# Patient Record
Sex: FEMALE | NOT HISPANIC OR LATINO | URBAN - METROPOLITAN AREA
[De-identification: names, ages, dates, MRNs, and addresses within clinical notes are randomized per-mention and may not be internally consistent; named-entity substitution may affect disease eponyms.]

---

## 2019-11-20 ENCOUNTER — IMPORTED ENCOUNTER (OUTPATIENT)
Dept: URBAN - METROPOLITAN AREA CLINIC 31 | Facility: CLINIC | Age: 59
End: 2019-11-20

## 2019-11-20 PROBLEM — H53.461: Noted: 2019-11-20

## 2019-11-20 PROCEDURE — 92004 COMPRE OPH EXAM NEW PT 1/>: CPT

## 2019-11-20 PROCEDURE — 92083 EXTENDED VISUAL FIELD XM: CPT

## 2019-11-20 PROCEDURE — 92015 DETERMINE REFRACTIVE STATE: CPT

## 2019-11-20 NOTE — PATIENT DISCUSSION
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   Dx Pituitary Tumor--Superior nasal defcet OD and small superior nasal defect OS--

## 2019-11-20 NOTE — PATIENT DISCUSSION
1.  Presbyope--- Rx Near--Dist good. 2. 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 PT exhibits no visual problems. VF shows superior nasal defects-. 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 .  Dx Pituitary Tumor--Superior nasal defcet OD and small superior nasal defect OS-- Repeat in 3 mths. 3. Pt will be getting repeat MRI to determine if longstanding or any progression. 4. Send exam and VF to DR Mason. 5.  RTN 3 mths VF -repeat

## 2020-10-08 ENCOUNTER — IMPORTED ENCOUNTER (OUTPATIENT)
Dept: URBAN - METROPOLITAN AREA CLINIC 31 | Facility: CLINIC | Age: 60
End: 2020-10-08

## 2020-10-08 PROBLEM — H53.461: Noted: 2020-10-08

## 2020-10-08 PROCEDURE — 92014 COMPRE OPH EXAM EST PT 1/>: CPT

## 2020-10-08 PROCEDURE — 92083 EXTENDED VISUAL FIELD XM: CPT

## 2020-10-08 NOTE — PATIENT DISCUSSION
1.  Presbyope--- Rx Near--Dist good. --No changes. 2. 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 Sx 3/20. PT exhibits no visual problems. VF  10/8/20 improved with little to no fioels defects. Much improved from 2019 VF. Need to repeat VF every 6 mths . 3. Send exam and VF to DR Mason. 4. Disc cls but not good candidate. 5. RTN 6 mths VF- 6. RTN 1 yr CE  send Vf and exams to Dr Tal Kurtz at [facility].

## 2022-04-02 ASSESSMENT — VISUAL ACUITY
OD_CC: 20/20-1
OS_CC: 20/20-1
OS_SC: 20/200
OS_SC: 20/200+2
OD_SC: 20/200
OS_CC: 20/20
OD_CC: 20/20
OD_SC: 20/200

## 2022-04-02 ASSESSMENT — TONOMETRY
OS_IOP_MMHG: 17
OS_IOP_MMHG: 14
OD_IOP_MMHG: 17
OD_IOP_MMHG: 14

## 2024-04-26 ENCOUNTER — PREPPED CHART (OUTPATIENT)
Dept: URBAN - METROPOLITAN AREA CLINIC 34 | Facility: CLINIC | Age: 64
End: 2024-04-26

## 2024-04-30 ENCOUNTER — NEW PATIENT (OUTPATIENT)
Dept: URBAN - METROPOLITAN AREA CLINIC 34 | Facility: CLINIC | Age: 64
End: 2024-04-30

## 2024-04-30 DIAGNOSIS — H52.4: ICD-10-CM

## 2024-04-30 PROCEDURE — 92015 DETERMINE REFRACTIVE STATE: CPT

## 2024-04-30 PROCEDURE — 92004 COMPRE OPH EXAM NEW PT 1/>: CPT

## 2024-04-30 ASSESSMENT — VISUAL ACUITY
OD_SC: 20/40
OS_CC: J1
OS_SC: 20/30
OS_SC: J10
OD_SC: J10
OD_CC: J1

## 2024-04-30 ASSESSMENT — TONOMETRY
OS_IOP_MMHG: 16
OD_IOP_MMHG: 17

## 2025-06-18 ENCOUNTER — COMPREHENSIVE EXAM (OUTPATIENT)
Age: 65
End: 2025-06-18

## 2025-06-18 DIAGNOSIS — H52.4: ICD-10-CM

## 2025-06-18 DIAGNOSIS — Z01.00: ICD-10-CM

## 2025-06-18 PROCEDURE — 92015 DETERMINE REFRACTIVE STATE: CPT

## 2025-06-18 PROCEDURE — 92014 COMPRE OPH EXAM EST PT 1/>: CPT
